# Patient Record
Sex: FEMALE | Race: WHITE | Employment: FULL TIME | URBAN - METROPOLITAN AREA
[De-identification: names, ages, dates, MRNs, and addresses within clinical notes are randomized per-mention and may not be internally consistent; named-entity substitution may affect disease eponyms.]

---

## 2017-12-13 ENCOUNTER — TRANSFERRED RECORDS (OUTPATIENT)
Dept: HEALTH INFORMATION MANAGEMENT | Facility: CLINIC | Age: 52
End: 2017-12-13

## 2018-06-13 ENCOUNTER — TRANSFERRED RECORDS (OUTPATIENT)
Dept: HEALTH INFORMATION MANAGEMENT | Facility: CLINIC | Age: 53
End: 2018-06-13

## 2020-01-07 ENCOUNTER — THERAPY VISIT (OUTPATIENT)
Dept: PHYSICAL THERAPY | Facility: CLINIC | Age: 55
End: 2020-01-07
Payer: COMMERCIAL

## 2020-01-07 DIAGNOSIS — M54.50 CHRONIC BILATERAL LOW BACK PAIN WITHOUT SCIATICA: ICD-10-CM

## 2020-01-07 DIAGNOSIS — G89.29 CHRONIC BILATERAL LOW BACK PAIN WITHOUT SCIATICA: ICD-10-CM

## 2020-01-07 DIAGNOSIS — Z47.89 SURGICAL AFTERCARE, MUSCULOSKELETAL SYSTEM: ICD-10-CM

## 2020-01-07 PROCEDURE — 97110 THERAPEUTIC EXERCISES: CPT | Mod: GP | Performed by: PHYSICAL THERAPIST

## 2020-01-07 PROCEDURE — 97112 NEUROMUSCULAR REEDUCATION: CPT | Mod: GP | Performed by: PHYSICAL THERAPIST

## 2020-01-07 PROCEDURE — 97161 PT EVAL LOW COMPLEX 20 MIN: CPT | Mod: GP | Performed by: PHYSICAL THERAPIST

## 2020-01-07 NOTE — PROGRESS NOTES
[place] [facility] Initial Evaluation  Subjective:  The history is provided by the patient. No  was used.   Type of problem:  Lumbar   Condition occurred with:  Insidious onset. This is a new condition    Patient reports pain:  Central lumbar spine. Radiates to:  No radiation. Associated symptoms:  Loss of strength. Symptoms are exacerbated by bending, certain positions and lifting and relieved by NSAID's.    Where condition occurred: other.Problem occurred: Pt underwent L1-3 laminectomy on 11/25/19 due to ongoing LBP. REports that she had been struggling with pain for ten years. She reports that since her surgery she still has pain, particularly with lifting.   and reported as 5/10 on pain scale. General health as reported by patient is good. Pertinent medical history includes:  High blood pressure and implanted device (IUD).    Surgeries include:  Cancer surgery and orthopedic surgery. Other surgery history details: breast cancer 2016.  Current medications:  High blood pressure medication, muscle relaxants and anti-depressants.     Pain is described as other (debilitating) and is intermittent. Pain is the same all the time. Since onset symptoms are gradually improving. Special tests:  MRI. Previous treatment includes chiropractic, other and physical therapy (injections).    Patient is Insurance. Restrictions include:  Working in normal job without restrictions.    Barriers include:  None as reported by patient.  Red flags:  None as reported by patient.                      Objective:  Standing Alignment:        Lumbar:  Lordosis decr            Gait:      Deviations:  General Deviations:  Stride length decr, stance time decr and justin decr    Flexibility/Screens:       Lower Extremity:  Decreased left lower extremity flexibility:Hamstrings    Decreased right lower extremity flexibility:  Hamstrings               Lumbar/SI Evaluation    Lumbar Myotomes:  normal            Lumbar  DTR's:  not assessed        Lumbar Dermtomes:  normal                  Lumbar Palpation:  Palpation (lumbar): incisional.                                                           Viky Lumbar Evaluation    Posture:  Sitting: fair    Lordosis: Reduced        Movement Loss:  Flexion (Flex): min  Extension (EXT): min  Side Glide R (SG R): nil  Side [place] L (SG L): nil      Conclusion: other                                         ROS    Assessment/Plan:    Patient is a 54 year old female with lumbar complaints.    Patient has the following significant findings with corresponding treatment plan.                Diagnosis 1:  S/p L1-3 laminectomy  Pain -  manual therapy, self management, education, directional preference exercise and home program  Decreased ROM/flexibility - manual therapy, therapeutic exercise, therapeutic activity and home program  Decreased joint mobility - manual therapy, therapeutic exercise, therapeutic activity and home program  Decreased strength - therapeutic exercise, therapeutic activities and home program  Inflammation - self management/home program  Impaired gait - gait training and home program  Impaired muscle performance - neuro re-education and home program  Decreased function - therapeutic activities and home program  Impaired posture - neuro re-education, therapeutic activities and home program    Cumulative Therapy Evaluation is: Low complexity.    Previous and current functional limitations:  (See Goal Flow Sheet for this information)    Short term and Long term goals: (See Goal Flow Sheet for this information)     Communication ability:  Patient appears to be able to clearly communicate and understand verbal and written communication and follow directions correctly.  Treatment Explanation - The following has been discussed with the patient:   RX ordered/plan of care  Anticipated outcomes  Possible risks and side effects  This patient would benefit from PT intervention to resume  normal activities.   Rehab potential is excellent.    Frequency:  1 X week, once daily  Duration:  for 8 weeks  Discharge Plan:  Achieve all LTG.  Independent in home treatment program.  Reach maximal therapeutic benefit.    Please refer to the daily flowsheet for treatment today, total treatment time and time spent performing 1:1 timed codes.

## 2020-01-07 NOTE — LETTER
[facility]  [address] [facility]. [address]  [facility] [id number] [phone]    2020    Re: She Espino   :   1965  MRN:  [id number]   REFERRING PHYSICIAN:   Ming Palacios    [facility] [place] of Initial Evaluation:  2020  Visits:  Rxs Used: 1  Reason for Referral:     Chronic bilateral low back pain without sciatica  Surgical aftercare, musculoskeletal system    EVALUATION SUMMARY    [facility] Initial Evaluation    Subjective:  The history is provided by the patient. No  was used.   Type of problem:  Lumbar  Condition occurred with:  Insidious onset. This is a new condition    Patient reports pain: Central lumbar spine. Radiates to:  No radiation. Associated symptoms:  Loss of strength. Symptoms are exacerbated by bending, certain positions and lifting and relieved by NSAID's.  Where condition occurred: other. Problem occurred: Pt underwent L1-3 laminectomy on 19 due to ongoing LBP. REports that she had been struggling with pain for ten years. She reports that since her surgery she still has pain, particularly with lifting. and reported as 5/10 on pain scale. General health as reported by patient is good. Pertinent medical history includes:  High blood pressure and implanted device (IUD). Surgeries include: Cancer surgery and orthopedic surgery. Other surgery history details: breast cancer 2016. Current medications:  High blood pressure medication, muscle relaxants and anti-depressants. Pain is described as other (debilitating) and is intermittent. Pain is the same all the time. Since onset symptoms are gradually improving. Special tests:  MRI. Previous treatment includes chiropractic, other and physical therapy (injections).    Patient is Insurance. Restrictions include: Working in normal job without  restrictions.  Barriers include: None as reported by patient.  Red flags: None as reported by patient.    Objective:  Standing Alignment:    Lumbar:  Lordosis decr  Gait:    Deviations:  General Deviations:  Stride length decr, stance time decr and justin decr  Flexibility/Screens:   Lower Extremity:  Decreased left lower extremity flexibility:Hamstrings  Decreased right lower extremity flexibility:  Hamstrings    Lumbar/SI Evaluation  Lumbar Myotomes:  normal  Lumbar DTR's:  not assessed  Lumbar Dermtomes:  normal  Lumbar Palpation:  Palpation (lumbar): incisional.    Viyk Lumbar Evaluation  Posture:  Sitting: fair  Lordosis: Reduced  Re: She L Kenzie   :   1965      Movement Loss:  Flexion (Flex): min  Extension (EXT): min  Side Glide R (SG R): nil  Side Glide L (SG L): nil  Conclusion: other    Assessment/Plan:    Patient is a 54 year old female with lumbar complaints.    Patient has the following significant findings with corresponding treatment plan.                Diagnosis 1:  S/p L1-3 laminectomy  Pain -  manual therapy, self management, education, directional preference exercise and home program  Decreased ROM/flexibility - manual therapy, therapeutic exercise, therapeutic activity and home program  Decreased joint mobility - manual therapy, therapeutic exercise, therapeutic activity and home program  Decreased strength - therapeutic exercise, therapeutic activities and home program  Inflammation - self management/home program  Impaired gait - gait training and home program  Impaired muscle performance - neuro re-education and home program  Decreased function - therapeutic activities and home program  Impaired posture - neuro re-education, therapeutic activities and home program  Cumulative Therapy Evaluation is: Low complexity.  Previous and current functional limitations:  (See Goal Flow Sheet for this information)    Short term and Long term goals: (See Goal Flow Sheet for this information)    Communication ability:  Patient appears to be able to clearly communicate and understand verbal and written communication and follow directions correctly.  Treatment Explanation - The following has been discussed with the patient:   RX ordered/plan of care  Anticipated outcomes  Possible risks and side effects  This patient would benefit from PT intervention to resume normal activities.   Rehab potential is excellent.    Frequency:  1 X week, once daily  Duration:  for 8 weeks  Discharge Plan:  Achieve all LTG.  Independent in home treatment program.  Reach maximal therapeutic benefit.    Thank you for your referral.      INQUIRIES  Therapist: Conrad Redmond DPT  [facility] PH[facility]ICAL THERAPY  [address] [place] [address]. [id number]  [facility] [address]  Phone: [phone]  Fax: [phone]

## 2020-01-14 ENCOUNTER — THERAPY VISIT (OUTPATIENT)
Dept: PHYSICAL THERAPY | Facility: CLINIC | Age: 55
End: 2020-01-14
Payer: COMMERCIAL

## 2020-01-14 DIAGNOSIS — M54.50 CHRONIC BILATERAL LOW BACK PAIN WITHOUT SCIATICA: ICD-10-CM

## 2020-01-14 DIAGNOSIS — G89.29 CHRONIC BILATERAL LOW BACK PAIN WITHOUT SCIATICA: ICD-10-CM

## 2020-01-14 DIAGNOSIS — Z47.89 SURGICAL AFTERCARE, MUSCULOSKELETAL SYSTEM: ICD-10-CM

## 2020-01-14 PROCEDURE — 97110 THERAPEUTIC EXERCISES: CPT | Mod: GP | Performed by: PHYSICAL THERAPIST

## 2020-01-14 PROCEDURE — 97112 NEUROMUSCULAR REEDUCATION: CPT | Mod: GP | Performed by: PHYSICAL THERAPIST

## 2020-01-14 PROCEDURE — 97140 MANUAL THERAPY 1/> REGIONS: CPT | Mod: GP | Performed by: PHYSICAL THERAPIST

## 2020-01-14 NOTE — PROGRESS NOTES
Subjective:  HPI                    Objective:  System    Physical Exam    General     ROS    Assessment/Plan:    SUBJECTIVE  Subjective changes as noted by pt:  Has been performing the HEP at the gym. Notices that she feels really out of shape.      Current pain level: 0/10     Changes in function:  None     Adverse reaction to treatment or activity:  None    OBJECTIVE  Changes in objective findings:  Yes, min loss lumbar EIS.         ASSESSMENT  She continues to require intervention to meet STG and LTG's: PT  Patient is progressing as expected.  Response to therapy has shown an improvement in  pain level  Progress made towards STG/LTG?  None    PLAN  Continue current treatment plan until patient demonstrates readiness to progress to higher level exercises.    PTA/ATC plan:  N/A    Please refer to the daily flowsheet for treatment today, total treatment time and time spent performing 1:1 timed codes.

## 2020-01-21 ENCOUNTER — THERAPY VISIT (OUTPATIENT)
Dept: PHYSICAL THERAPY | Facility: CLINIC | Age: 55
End: 2020-01-21
Payer: COMMERCIAL

## 2020-01-21 DIAGNOSIS — Z47.89 SURGICAL AFTERCARE, MUSCULOSKELETAL SYSTEM: ICD-10-CM

## 2020-01-21 DIAGNOSIS — G89.29 CHRONIC BILATERAL LOW BACK PAIN WITHOUT SCIATICA: ICD-10-CM

## 2020-01-21 DIAGNOSIS — M54.50 CHRONIC BILATERAL LOW BACK PAIN WITHOUT SCIATICA: ICD-10-CM

## 2020-01-21 PROCEDURE — 97140 MANUAL THERAPY 1/> REGIONS: CPT | Mod: GP | Performed by: PHYSICAL THERAPIST

## 2020-01-21 PROCEDURE — 97112 NEUROMUSCULAR REEDUCATION: CPT | Mod: GP | Performed by: PHYSICAL THERAPIST

## 2020-01-21 PROCEDURE — 97110 THERAPEUTIC EXERCISES: CPT | Mod: GP | Performed by: PHYSICAL THERAPIST

## 2020-01-21 NOTE — PROGRESS NOTES
[place] [facility] Initial Evaluation  Subjective:  HPI                    Objective:  System    Physical Exam    General     ROS    Assessment/Plan:    SUBJECTIVE  Subjective changes as noted by pt:  Tried her walk to the conference (2.5 blocks) and had to stop half-way through due to pain in the B low back. Was sore the whole weekend. Having to take a lot of Tylenol.      Current pain level: 4/10     Changes in function:  None     Adverse reaction to treatment or activity:  None    OBJECTIVE  Changes in objective findings:  Yes, min loss lumbar EIS.          ASSESSMENT  She continues to require intervention to meet STG and LTG's: PT  No change of symptoms has been noted.  Response to therapy has shown lack of progress in  pain level  Progress made towards STG/LTG?  None    PLAN  Continue current treatment plan until patient demonstrates readiness to progress to higher level exercises.    PTA/ATC plan:  N/A    Please refer to the daily flowsheet for treatment today, total treatment time and time spent performing 1:1 timed codes.

## 2020-01-28 ENCOUNTER — THERAPY VISIT (OUTPATIENT)
Dept: PHYSICAL THERAPY | Facility: CLINIC | Age: 55
End: 2020-01-28
Payer: COMMERCIAL

## 2020-01-28 DIAGNOSIS — Z47.89 SURGICAL AFTERCARE, MUSCULOSKELETAL SYSTEM: ICD-10-CM

## 2020-01-28 DIAGNOSIS — M54.50 CHRONIC BILATERAL LOW BACK PAIN WITHOUT SCIATICA: ICD-10-CM

## 2020-01-28 DIAGNOSIS — G89.29 CHRONIC BILATERAL LOW BACK PAIN WITHOUT SCIATICA: ICD-10-CM

## 2020-01-28 PROCEDURE — 97140 MANUAL THERAPY 1/> REGIONS: CPT | Mod: GP | Performed by: PHYSICAL THERAPIST

## 2020-01-28 PROCEDURE — 97110 THERAPEUTIC EXERCISES: CPT | Mod: GP | Performed by: PHYSICAL THERAPIST

## 2020-01-28 PROCEDURE — 97112 NEUROMUSCULAR REEDUCATION: CPT | Mod: GP | Performed by: PHYSICAL THERAPIST

## 2020-01-28 NOTE — PROGRESS NOTES
Subjective:  HPI                    Objective:  System    Physical Exam    General     ROS    Assessment/Plan:    SUBJECTIVE  Subjective changes as noted by pt:  Doing OK. Lifting reportedly still difficult. Has started doing the recumbent bike at work for 15 min/day. Notes low back pain with bending. Has not tried walking much. Made a trip to grocery store without much pain. Performing the EIS about 2x/day.         Current pain level: 3/10     Changes in function:  None     Adverse reaction to treatment or activity:  None    OBJECTIVE  Changes in objective findings:  Yes, min loss lumbar extension AROM pain free.         ASSESSMENT  She continues to require intervention to meet STG and LTG's: PT  Patient is progressing as expected.  Response to therapy has shown an improvement in  pain level  Progress made towards STG/LTG?  None    PLAN  Continue current treatment plan until patient demonstrates readiness to progress to higher level exercises.    PTA/ATC plan:  N/A    Please refer to the daily flowsheet for treatment today, total treatment time and time spent performing 1:1 timed codes.

## 2020-02-04 ENCOUNTER — THERAPY VISIT (OUTPATIENT)
Dept: PHYSICAL THERAPY | Facility: CLINIC | Age: 55
End: 2020-02-04
Payer: COMMERCIAL

## 2020-02-04 DIAGNOSIS — Z47.89 SURGICAL AFTERCARE, MUSCULOSKELETAL SYSTEM: ICD-10-CM

## 2020-02-04 DIAGNOSIS — G89.29 CHRONIC BILATERAL LOW BACK PAIN WITHOUT SCIATICA: ICD-10-CM

## 2020-02-04 DIAGNOSIS — M54.50 CHRONIC BILATERAL LOW BACK PAIN WITHOUT SCIATICA: ICD-10-CM

## 2020-02-04 PROCEDURE — 97112 NEUROMUSCULAR REEDUCATION: CPT | Mod: GP | Performed by: PHYSICAL THERAPIST

## 2020-02-04 PROCEDURE — 97110 THERAPEUTIC EXERCISES: CPT | Mod: GP | Performed by: PHYSICAL THERAPIST

## 2020-02-04 PROCEDURE — 97140 MANUAL THERAPY 1/> REGIONS: CPT | Mod: GP | Performed by: PHYSICAL THERAPIST

## 2020-02-04 NOTE — PROGRESS NOTES
Subjective:  HPI                    Objective:  System    Physical Exam    General     ROS    Assessment/Plan:    SUBJECTIVE  Subjective changes as noted by pt:  Notes that the EIS helps pain. Performs it if sore and it decreases pain. Was able to stand a little longer than usual during a meeting.         Current pain level: 3/10     Changes in function:  Yes (See Goal flowsheet attached for changes in current functional level)     Adverse reaction to treatment or activity:  None    OBJECTIVE  Changes in objective findings:  Yes, ambulated 5 min at 1.3 mph on TM with 1/10 pain in low back (one hand on rail).           ASSESSMENT  She continues to require intervention to meet STG and LTG's: PT  Patient is progressing as expected.  Response to therapy has shown an improvement in  pain level  Progress made towards STG/LTG?  Yes (See Goal flowsheet attached for updates on achievement of STG and LTG)    PLAN  Continue current treatment plan until patient demonstrates readiness to progress to higher level exercises.    PTA/ATC plan:  N/A    Please refer to the daily flowsheet for treatment today, total treatment time and time spent performing 1:1 timed codes.

## 2020-03-10 ENCOUNTER — HEALTH MAINTENANCE LETTER (OUTPATIENT)
Age: 55
End: 2020-03-10

## 2020-03-12 PROBLEM — Z47.89 SURGICAL AFTERCARE, MUSCULOSKELETAL SYSTEM: Status: RESOLVED | Noted: 2020-01-07 | Resolved: 2020-03-12

## 2020-03-12 PROBLEM — G89.29 CHRONIC BILATERAL LOW BACK PAIN: Status: RESOLVED | Noted: 2020-01-07 | Resolved: 2020-03-12

## 2020-03-12 PROBLEM — M54.50 CHRONIC BILATERAL LOW BACK PAIN: Status: RESOLVED | Noted: 2020-01-07 | Resolved: 2020-03-12

## 2020-03-12 NOTE — PROGRESS NOTES
Discharge Note    Progress reporting period is from initial evaluation date (please see noted date below) to Feb 4, 2020.  No linked episodes      She failed to follow up and current status is unknown.  Please see information below for last relevant information on current status.  Patient seen for 5 visits.    SUBJECTIVE  Subjective changes noted by patient:     .  Current pain level is  .     Previous pain level was   .   Changes in function:  Yes (See Goal flowsheet attached for changes in current functional level)  Adverse reaction to treatment or activity: None    OBJECTIVE  Changes noted in objective findings:       ASSESSMENT/PLAN  Diagnosis: s/p L1-3 laminectomy   Updated problem list and treatment plan:   Pain - HEP  Decreased ROM/flexibility - HEP  Decreased function - HEP  Decreased strength - HEP  STG/LTGs have been met or progress has been made towards goals:  Yes, please see goal flowsheet for most current information  Assessment of Progress: current status is unknown.    Last current status:     Self Management Plans:  HEP  I have re-evaluated this patient and find that the nature, scope, duration and intensity of the therapy is appropriate for the medical condition of the patient.  She continues to require the following intervention to meet STG and LTG's:  HEP.    Recommendations:  Discharge with current home program.  Patient to follow up with MD as needed.    Please refer to the daily flowsheet for treatment today, total treatment time and time spent performing 1:1 timed codes.

## 2020-04-07 ENCOUNTER — TELEPHONE (OUTPATIENT)
Dept: PHYSICAL THERAPY | Facility: CLINIC | Age: 55
End: 2020-04-07

## 2021-04-24 ENCOUNTER — HEALTH MAINTENANCE LETTER (OUTPATIENT)
Age: 56
End: 2021-04-24

## 2021-06-09 ENCOUNTER — OFFICE VISIT - HEALTHEAST (OUTPATIENT)
Dept: OTOLARYNGOLOGY | Facility: CLINIC | Age: 56
End: 2021-06-09

## 2021-06-09 DIAGNOSIS — Z48.00 ENCOUNTER FOR REMOVAL OF NASAL PACK: ICD-10-CM

## 2021-06-09 DIAGNOSIS — H74.8X2 HEMOTYMPANUM, LEFT: ICD-10-CM

## 2021-06-09 DIAGNOSIS — R04.0 EPISTAXIS: ICD-10-CM

## 2021-06-09 RX ORDER — ONDANSETRON 4 MG/1
TABLET, ORALLY DISINTEGRATING ORAL
Status: SHIPPED | COMMUNITY
Start: 2021-04-11

## 2021-06-09 RX ORDER — LORAZEPAM 0.5 MG/1
0.5 TABLET ORAL
Status: SHIPPED | COMMUNITY
Start: 2020-04-20

## 2021-06-09 RX ORDER — HYDROXYZINE HYDROCHLORIDE 10 MG/1
TABLET, FILM COATED ORAL
Status: SHIPPED | COMMUNITY
Start: 2020-08-26

## 2021-06-09 RX ORDER — VENLAFAXINE HYDROCHLORIDE 150 MG/1
CAPSULE, EXTENDED RELEASE ORAL
Status: SHIPPED | COMMUNITY
Start: 2021-03-19

## 2021-06-09 RX ORDER — CITALOPRAM HYDROBROMIDE 20 MG/1
TABLET ORAL
Status: SHIPPED | COMMUNITY
Start: 2021-06-02

## 2021-06-09 RX ORDER — LETROZOLE 2.5 MG/1
2.5 TABLET, FILM COATED ORAL DAILY
Status: SHIPPED | COMMUNITY
Start: 2021-06-03

## 2021-06-09 RX ORDER — LISINOPRIL AND HYDROCHLOROTHIAZIDE 12.5; 2 MG/1; MG/1
TABLET ORAL
Status: SHIPPED | COMMUNITY
Start: 2021-05-03

## 2021-06-09 RX ORDER — SIMVASTATIN 20 MG
TABLET ORAL
Status: SHIPPED | COMMUNITY
Start: 2021-03-19

## 2021-06-09 RX ORDER — MONTELUKAST SODIUM 10 MG/1
TABLET ORAL
Status: SHIPPED | COMMUNITY
Start: 2020-11-30

## 2021-06-26 NOTE — PROGRESS NOTES
CHIEF COMPLAINT:   Nose Concern        HISTORY OF PRESENT ILLNESS    She Espino   was seen at the behest of Apoorva Raya MD  for removal of nasal packing.  She experciened left sided nosebleed on the 3rd of this month at home getting up from watching TV.  History of left sided epistaxis 1 year ago (again, left side).  Feels like the hearing is down on the left side.       FINAL IMPRESSION:  1. Acute anterior epistaxis       ED COURSE & MEDICAL DECISION MAKING:    Pertinent Labs & Imaging studies reviewed. (See chart for details)  56 y.o. female otherwise healthy with previous episode of epistaxis requiring cautery who presents to the Emergency Department for evaluation of epistaxis starting around 430.  Seen at urgent care prior to arrival and given TXA and Rhino Rocket but continues to bleed.  Anticoagulated on aspirin only.  Blood pressure stable.      On exam Rhino Rocket removed and patient has steady bleed coming from the left anterior nare.  Unable to visualize source due to active bleeding.  Symptoms are consistent with anterior epistaxis.  No evidence of posterior bleed.     A 7.5 cm anterior posterior Rhino Rocket was placed on the left.  Patient observed for 30 minutes thereafter and has good hemostasis with this.  We will discharged home with outpatient ENT follow-up and return precautions.           REVIEW OF SYSTEMS    Review of Systems: a 10-system review is reviewed at this encounter.  See scanned document.         PHYSICAL EXAM:        HEAD: Normal appearance and symmetry:  No cutaneous lesions.      EARS:    Normal TM's bilaterally. Normal auditory canals and external ears. Non-tender.  Left hemotympanum         NOSE:    Dorsum:   straight  Septum: normal  Mucosa:  Moist, intact  Inferior turbinates:  Normal    Rhinorocket in position left nasal cavity.  (removed without incident.       ORAL CAVITY/OROPHARYNX:    Lips:  Normal.  Tongue: normal, midline  Mucosa:   no lesions  Tonsils:  1+      NECK:   Trachea:  midline.   Thyroid:  normal   Adenopathy:  none       NEURO:   Alert and Oriented       RESPIRATORY:   Symmetry and Respiratory effort    PSYCH:   normal mood and affect    SKIN:  warm and dry         IMPRESSION:    Encounter Diagnoses   Name Primary?     Encounter for removal of nasal pack Yes     Epistaxis      Hemotympanum, left        RECOMMENDATIONS:    No orders of the defined types were placed in this encounter.     No medications were ordered this encounter     Return 1 month with audiogram.  Afrin/cotton ball for recurrent nosebleeds as needed

## 2021-06-26 NOTE — PATIENT INSTRUCTIONS - HE
Return 1 month with audiogram  Afrin soaked cotton ball with gentle pressure for 20 minutes in event of future nosebleed

## 2021-07-14 ENCOUNTER — OFFICE VISIT (OUTPATIENT)
Dept: AUDIOLOGY | Facility: CLINIC | Age: 56
End: 2021-07-14
Payer: COMMERCIAL

## 2021-07-14 ENCOUNTER — OFFICE VISIT (OUTPATIENT)
Dept: OTOLARYNGOLOGY | Facility: CLINIC | Age: 56
End: 2021-07-14
Payer: COMMERCIAL

## 2021-07-14 DIAGNOSIS — J31.0 CHRONIC RHINITIS: Primary | ICD-10-CM

## 2021-07-14 DIAGNOSIS — H90.3 SENSORINEURAL HEARING LOSS, BILATERAL: Primary | ICD-10-CM

## 2021-07-14 DIAGNOSIS — H90.3 SENSORINEURAL HEARING LOSS (SNHL) OF BOTH EARS: ICD-10-CM

## 2021-07-14 PROCEDURE — 92557 COMPREHENSIVE HEARING TEST: CPT | Performed by: AUDIOLOGIST

## 2021-07-14 PROCEDURE — 92567 TYMPANOMETRY: CPT | Performed by: AUDIOLOGIST

## 2021-07-14 PROCEDURE — 99207 PR NO CHARGE LOS: CPT | Performed by: AUDIOLOGIST

## 2021-07-14 PROCEDURE — 99213 OFFICE O/P EST LOW 20 MIN: CPT | Performed by: OTOLARYNGOLOGY

## 2021-07-14 NOTE — PROGRESS NOTES
CHIEF COMPLAINT:  Recheck      HISTORY OF PRESENT ILLNESS    She was seen in follow up for  Audiogram review.  No further episodes of epistaxis.  Last nosebleed was June 3rd.   Some runny noes with spicy foods.  No hearing concerns.  No tinnitus or vertigo.          REVIEW OF SYSTEMS    Review of Systems: a 10-system review is reviewed at this encounter.  See scanned document.     Patient has no known allergies.     PHYSICAL EXAM:        HEAD: Normal appearance and symmetry:  No cutaneous lesions.      EARS:   Auricles normal         NOSE:    Dorsum:   Straight  Setpum:   Anterior spur to right.        ORAL CAVITY/OROPHARYNX:    Lips:  Normal.     NECK:  Trachea:  midline       NEURO:   Alert and Oriented    GAIT AND STATION:  normal     RESPIRATORY:   Symmetry and Respiratory effort    PSYCH:   normal mood and affect    SKIN:  warm and dry     Audiogram:  HF SNHL with preserved WR    IMPRESSION:     Diagnoses       Codes Comments    Chronic rhinitis    -  Primary J31.0              RECOMMENDATIONS:    Return visit for annual hearing test

## 2021-07-14 NOTE — LETTER
7/14/2021         RE: She Espino  [address] [facility] [address]        Dear Colleague,    Thank you for referring your patient, She Espino, to the [facility]. Please see a copy of my visit note below.     CHIEF COMPLAINT:  Recheck      HISTORY OF PRESENT ILLNESS    She was seen in follow up for  Audiogram review.  No further episodes of epistaxis.  Last nosebleed was June 3rd.   Some runny noes with spicy foods.  No hearing concerns.  No tinnitus or vertigo.          REVIEW OF SYSTEMS    Review of Systems: a 10-system review is reviewed at this encounter.  See scanned document.     Patient has no known allergies.     PHYSICAL EXAM:        HEAD: Normal appearance and symmetry:  No cutaneous lesions.      EARS:   Auricles normal         NOSE:    Dorsum:   Straight  Setpum:   Anterior spur to right.        ORAL CAVITY/OROPHARYNX:    Lips:  Normal.     NECK:  Trachea:  midline       NEURO:   Alert and Oriented    GAIT AND STATION:  normal     RESPIRATORY:   Symmetry and Respiratory effort    PSYCH:   normal mood and affect    SKIN:  warm and dry     Audiogram:  HF SNHL with preserved WR    IMPRESSION:     Diagnoses       Codes Comments    Chronic rhinitis    -  Primary J31.0              RECOMMENDATIONS:    Return visit for annual hearing test          Again, thank you for allowing me to participate in the care of your patient.        Sincerely,        Kashmir Haile MD

## 2021-07-14 NOTE — PROGRESS NOTES
AUDIOLOGY REPORT    SUMMARY: Audiology visit completed. See audiogram for results.     RECOMMENDATIONS: Follow-up with ENT.    Damien Pandey, [facility]  [place] Licensed Audiologist #[id number]

## 2021-10-04 ENCOUNTER — HEALTH MAINTENANCE LETTER (OUTPATIENT)
Age: 56
End: 2021-10-04

## 2022-03-20 ENCOUNTER — HEALTH MAINTENANCE LETTER (OUTPATIENT)
Age: 57
End: 2022-03-20

## 2022-05-15 ENCOUNTER — HEALTH MAINTENANCE LETTER (OUTPATIENT)
Age: 57
End: 2022-05-15

## 2022-09-11 ENCOUNTER — HEALTH MAINTENANCE LETTER (OUTPATIENT)
Age: 57
End: 2022-09-11

## 2023-06-03 ENCOUNTER — HEALTH MAINTENANCE LETTER (OUTPATIENT)
Age: 58
End: 2023-06-03

## 2024-05-04 ENCOUNTER — HEALTH MAINTENANCE LETTER (OUTPATIENT)
Age: 59
End: 2024-05-04

## 2024-07-13 ENCOUNTER — HEALTH MAINTENANCE LETTER (OUTPATIENT)
Age: 59
End: 2024-07-13